# Patient Record
Sex: MALE | Race: BLACK OR AFRICAN AMERICAN
[De-identification: names, ages, dates, MRNs, and addresses within clinical notes are randomized per-mention and may not be internally consistent; named-entity substitution may affect disease eponyms.]

---

## 2019-10-03 ENCOUNTER — HOSPITAL ENCOUNTER (EMERGENCY)
Dept: HOSPITAL 97 - ER | Age: 64
Discharge: HOME | End: 2019-10-03
Payer: OTHER GOVERNMENT

## 2019-10-03 VITALS — SYSTOLIC BLOOD PRESSURE: 167 MMHG | OXYGEN SATURATION: 98 % | DIASTOLIC BLOOD PRESSURE: 83 MMHG

## 2019-10-03 VITALS — TEMPERATURE: 97.4 F

## 2019-10-03 DIAGNOSIS — E11.9: ICD-10-CM

## 2019-10-03 DIAGNOSIS — H60.02: Primary | ICD-10-CM

## 2019-10-03 DIAGNOSIS — F17.210: ICD-10-CM

## 2019-10-03 PROCEDURE — 99283 EMERGENCY DEPT VISIT LOW MDM: CPT

## 2019-10-03 PROCEDURE — 82962 GLUCOSE BLOOD TEST: CPT

## 2019-10-03 PROCEDURE — 36415 COLL VENOUS BLD VENIPUNCTURE: CPT

## 2019-10-03 NOTE — EDPHYS
Physician Documentation                                                                           

 [facility]                                                                 

Name: Larry Palm                                                                                

Age: 63 yrs                                                                                       

Sex: Male                                                                                         

: 1955                                                                                   

MRN: [id number]                                                                                   

Arrival Date: 10/03/2019                                                                          

Time: 08:53                                                                                       

Account#: [id number]                                                                            

Bed 20                                                                                            

Private MD:                                                                                       

IVAN Physician Kofi Hughes                                                                      

HPI:                                                                                              

10/03                                                                                             

09:35 This 63 yrs old Black Male presents to ER via Ambulatory with complaints of Ear Pain.   juarez 

09:35 The patient presents with swelling, tenderness. The complaints affect the left ear.     juarez 

      Onset: The symptoms/episode began/occurred 3 day(s) ago. Modifying factors: The             

      symptoms are alleviated by nothing, the symptoms are aggravated by nothing. Associated      

      signs and symptoms: The patient has no apparent associated signs or symptoms.               

                                                                                                  

Historical:                                                                                       

- Allergies:                                                                                      

09:01 No Known Allergies;                                                                     ss  

- PMHx:                                                                                           

09:57 Diabetes - NIDDM;                                                                       jl7 

                                                                                                  

- Immunization history:: Adult Immunizations up to date.                                          

- Social history:: Smoking status: Patient uses tobacco products, smokes one-half pack            

  cigarettes per day.                                                                             

- Ebola Screening: : Patient denies exposure to infectious person Patient denies travel           

  to an Ebola-affected area in the 21 days before illness onset.                                  

- Family history:: not pertinent.                                                                 

                                                                                                  

                                                                                                  

ROS:                                                                                              

09:35 Constitutional: Negative for fever, chills, and weight loss, Eyes: Negative for injury, juarez 

      pain, redness, and discharge, Neck: Negative for injury, pain, and swelling,                

      Cardiovascular: Negative for chest pain, palpitations, and edema, Respiratory: Negative     

      for shortness of breath, cough, wheezing, and pleuritic chest pain, Abdomen/GI:             

      Negative for abdominal pain, nausea, vomiting, diarrhea, and constipation, Back:            

      Negative for injury and pain, : Negative for injury, bleeding, discharge, and             

      swelling, MS/Extremity: Negative for injury and deformity, Skin: Negative for injury,       

      rash, and discoloration, Neuro: Negative for headache, weakness, numbness, tingling,        

      and seizure, Psych: Negative for depression, anxiety, suicide ideation, homicidal           

      ideation, and hallucinations, Allergy/Immunology: Negative for hives, rash, and             

      allergies, Endocrine: Negative for neck swelling, polydipsia, polyuria, polyphagia, and     

      marked weight changes, Hematologic/Lymphatic: Negative for swollen nodes, abnormal          

      bleeding, and unusual bruising.                                                             

09:35 ENT: Positive for ear pain.                                                                 

                                                                                                  

Exam:                                                                                             

09:35 Constitutional:  This is a well developed, well nourished patient who is awake, alert,  juarez 

      and in no acute distress. Head/Face:  Normocephalic, atraumatic. Eyes:  Pupils equal        

      round and reactive to light, extra-ocular motions intact.  Lids and lashes normal.          

      Conjunctiva and sclera are non-icteric and not injected.  Cornea within normal limits.      

      Periorbital areas with no swelling, redness, or edema. Neck:  Trachea midline, no           

      thyromegaly or masses palpated, and no cervical lymphadenopathy.  Supple, full range of     

      motion without nuchal rigidity, or vertebral point tenderness.  No Meningismus.             

      Chest/axilla:  Normal chest wall appearance and motion.  Nontender with no deformity.       

      No lesions are appreciated. Cardiovascular:  Regular rate and rhythm with a normal S1       

      and S2.  No gallops, murmurs, or rubs.  Normal PMI, no JVD.  No pulse deficits.             

      Respiratory:  Lungs have equal breath sounds bilaterally, clear to auscultation and         

      percussion.  No rales, rhonchi or wheezes noted.  No increased work of breathing, no        

      retractions or nasal flaring. Abdomen/GI:  Soft, non-tender, with normal bowel sounds.      

      No distension or tympany.  No guarding or rebound.  No evidence of tenderness               

      throughout. Back:  No spinal tenderness.  No costovertebral tenderness.  Full range of      

      motion. MS/ Extremity:  Pulses equal, no cyanosis.  Neurovascular intact.  Full, normal     

      range of motion. Neuro:  Awake and alert, GCS 15, oriented to person, place, time, and      

      situation.  Cranial nerves II-XII grossly intact.  Motor strength 5/5 in all                

      extremities.  Sensory grossly intact.  Cerebellar exam normal.  Normal gait. Psych:         

      Awake, alert, with orientation to person, place and time.  Behavior, mood, and affect       

      are within normal limits.                                                                   

09:35 ENT: External ear(s): abscess, that is small, cellulitis.                                   

                                                                                                  

Vital Signs:                                                                                      

08:59  / 89; Pulse 81; Resp 16; Temp 97.4(O); Pulse Ox 99% on R/A; Weight 116.57 kg;    ss  

      Height 6 ft. 5 in. (195.58 cm); Pain 7/10;                                                  

09:57  / 83; Pulse 77; Resp 16 S; Pulse Ox 98% on R/A;                                   

08:59 Body Mass Index 30.48 (116.57 kg, 195.58 cm)                                            ss  

                                                                                                  

MDM:                                                                                              

08:55 Patient medically screened.                                                             [facility] 

09:35 Data reviewed: vital signs, nurses notes, lab test result(s).                           [facility] 

                                                                                                  

10/03                                                                                             

09:44 Order name: NOVA                                                                        dh3 

10/03                                                                                             

09:35 Order name: Blood Glucose Level; Complete Time: 09:42                                   [facility] 

                                                                                                  

Administered Medications:                                                                         

09:50 Drug: Doxycycline 200 mg Route: PO;                                                     [facility] 

09:58 Follow up: Response: Medication administered at discharge.                               

09:50 Drug: Bactrim (160 mg-800 mg (DS) 1 tablet Route: PO;                                    

09:58 Follow up: Response: Medication administered at discharge.                               

09:57 Drug: Bactroban Ointment 2 % 1 application Route: Topical; Site: wound;                  

09:58 Follow up: Response: Medication administered at discharge.                              7 

                                                                                                  

                                                                                                  

Disposition:                                                                                      

10/03/19 09:40 Discharged to Home. Impression: Abscess of external ear - lobe, Type 2 diabetes    

  mellitus.                                                                                       

- Condition is Stable.                                                                            

- Discharge Instructions: Skin Abscess, Type 2 Diabetes Mellitus, Diagnosis, Adult,               

  Skin Abscess, Easy-to-Read, Type 2 Diabetes Mellitus, Diagnosis, Adult, Easy-to-Read.           

- Prescriptions for Bactroban 2 % Topical Ointment - Apply to affected area 1                     

  application by TOPICAL route every 12 hours; 30 gram. Tylenol- Codeine #3 300-30 mg             

  Oral Tablet - take 2 tablets by ORAL route every 6 hours As needed; 20 tablet.                  

  Doxycycline Hyclate 100 mg Oral Tablet - take 1 tablet by ORAL route every 12 hours;            

  20 tablet. Bactrim - 160 mg Oral Tablet - take 1 tablet by ORAL route every 12            

  hours for 10 days; 20 tablet.                                                                   

- Medication Reconciliation Form, Thank You Letter, Antibiotic Education, Prescription            

  Opioid Use form.                                                                                

- Follow up: Private Physician; When: 2 - 3 days; Reason: Recheck today's complaints,             

  Continuance of care, Re-evaluation by your physician. Follow up: Isabel Guadalupe MD; When: 2 - 3 days; Reason: Recheck today's complaints, Continuance of care,                  

  Re-evaluation by your physician.                                                                

- Problem is new.                                                                                 

- Symptoms have improved.                                                                         

                                                                                                  

                                                                                                  

                                                                                                  

Signatures:                                                                                       

Dispatcher [employer]                           Kofi Pineda MD MD cha                                                  

Smirch, Adilia, RN                      RN   ss                                                   

Osmel Neely RN                        RN   jl7                                                  

                                                                                                  

Corrections: (The following items were deleted from the chart)                                    

09:59 09:40 10/03/2019 09:40 Discharged to Home. Impression: Abscess of external ear - lobe;  jl7 

      Type 2 diabetes mellitus. Condition is Stable. Forms are Medication Reconciliation          

      Form, Thank You Letter, Antibiotic Education, Prescription Opioid Use. Follow up:           

      Private Physician; When: 2 - 3 days; Reason: Recheck today's complaints, Continuance of     

      care, Re-evaluation by your physician. Follow up: Isabel Guadalupe; When: 2 - 3 days;      

      Reason: Recheck today's complaints, Continuance of care, Re-evaluation by your              

      physician. Problem is new. Symptoms have improved. juarez                                      

                                                                                                  

**************************************************************************************************

## 2019-10-03 NOTE — ER
Nurse's Notes                                                                                     

 [facility]                                                                 

Name: Larry Palm                                                                                

Age: 63 yrs                                                                                       

Sex: Male                                                                                         

: 1955                                                                                   

MRN: [id number]                                                                                   

Arrival Date: 10/03/2019                                                                          

Time: 08:53                                                                                       

Account#: [id number]                                                                            

Bed 20                                                                                            

Private MD:                                                                                       

Diagnosis: Abscess of external ear-lobe;Type 2 diabetes mellitus                                  

                                                                                                  

Presentation:                                                                                     

10/03                                                                                             

09:01 Presenting complaint: Patient states: pain and swelling to lobe of L ear that began two ss  

      weeks ago. Transition of care: patient was not received from another setting of care.       

      Onset of symptoms was 2019. Risk Assessment: Do you want to hurt yourself or      

      someone else? Patient reports no desire to harm self or others. Initial Sepsis Screen:      

      Does the patient meet any 2 criteria? No. Patient's initial sepsis screen is negative.      

      Does the patient have a suspected source of infection? No. Patient's initial sepsis         

      screen is negative. Care prior to arrival: None.                                            

09:01 Method Of Arrival: Ambulatory                                                           ss  

09:01 Acuity: MARLEN 5                                                                           ss  

                                                                                                  

Historical:                                                                                       

- Allergies:                                                                                      

09:01 No Known Allergies;                                                                     ss  

- PMHx:                                                                                           

09:57 Diabetes - NIDDM;                                                                       jl7 

                                                                                                  

- Immunization history:: Adult Immunizations up to date.                                          

- Social history:: Smoking status: Patient uses tobacco products, smokes one-half pack            

  cigarettes per day.                                                                             

- Ebola Screening: : Patient denies exposure to infectious person Patient denies travel           

  to an Ebola-affected area in the 21 days before illness onset.                                  

- Family history:: not pertinent.                                                                 

                                                                                                  

                                                                                                  

Screenin:02 Abuse screen: Denies threats or abuse. Denies injuries from another. Nutritional        ss  

      screening: No deficits noted. Tuberculosis screening: Never had TB. Fall Risk None          

      identified.                                                                                 

                                                                                                  

Assessment:                                                                                       

09:02 General: Appears in no apparent distress. comfortable, Behavior is calm, cooperative.   ss  

      Pain: Complains of pain in left ear lobe Pain currently is 7 out of 10 on a pain scale.     

      Quality of pain is described as tender, Pain began 2 weeks ago Is continuous. Neuro:        

      Level of Consciousness is awake, alert, obeys commands, Oriented to person, place,          

      time, situation. Cardiovascular: Capillary refill < 3 seconds is brisk in bilateral         

      fingers. Respiratory: Airway is patent Respiratory effort is even, unlabored,               

      Respiratory pattern is regular, symmetrical. GI: No signs and/or symptoms were reported     

      involving the gastrointestinal system. EENT: Nares are clear Throat is clear. Derm:         

      Skin is pink, warm \T\ dry. normal. Musculoskeletal: Range of motion: intact in all         

      extremities, Swelling mild swelling and redness noted to lobe of L ear.                     

                                                                                                  

Vital Signs:                                                                                      

08:59  / 89; Pulse 81; Resp 16; Temp 97.4(O); Pulse Ox 99% on R/A; Weight 116.57 kg;      

      Height 6 ft. 5 in. (195.58 cm); Pain 7/10;                                                  

09:57  / 83; Pulse 77; Resp 16 S; Pulse Ox 98% on R/A;                                  jl7 

08:59 Body Mass Index 30.48 (116.57 kg, 195.58 cm)                                              

                                                                                                  

ED Course:                                                                                        

08:53 Patient arrived in ED.                                                                  mr  

08:55 Kofi Hughes MD is Attending Physician.                                             [facility] 

09:01 Arm band placed on left wrist.                                                            

09:02 Triage completed.                                                                         

09:02 Patient has correct armband on for positive identification. Bed in low position. Call     

      light in reach.                                                                             

09:18 Neely, Jahala, RN is Primary Nurse.                                                      jl7 

09:39 Isabel Guadalupe MD is Referral Physician.                                           juarez 

09:56 No provider procedures requiring assistance completed. Patient did not have IV access   jl7 

      during this emergency room visit.                                                           

                                                                                                  

Administered Medications:                                                                         

09:50 Drug: Doxycycline 200 mg Route: PO;                                                     jl7 

09:58 Follow up: Response: Medication administered at discharge.                              jl7 

09:50 Drug: Bactrim (160 mg-800 mg (DS) 1 tablet Route: PO;                                   jl7 

09:58 Follow up: Response: Medication administered at discharge.                              jl7 

09:57 Drug: Bactroban Ointment 2 % 1 application Route: Topical; Site: wound;                 jl7 

09:58 Follow up: Response: Medication administered at discharge.                              jl7 

                                                                                                  

                                                                                                  

Outcome:                                                                                          

09:40 Discharge ordered by MD.                                                                juarez 

09:56 Discharged to home ambulatory.                                                          jl7 

09:56 Condition: stable                                                                           

09:56 Discharge instructions given to patient, Instructed on discharge instructions, follow       

      up and referral plans. medication usage, Demonstrated understanding of instructions,        

      follow-up care, medications, Prescriptions given X 4.                                       

09:59 Patient left the ED.                                                                    jl7 

                                                                                                  

Signatures:                                                                                       

Kofi Hughes MD MD cha Rivera, Mary mr Smirch, Shelby, RN RN                                                      

Neely, Jahala, RN                        RN   jl7                                                  

                                                                                                  

**************************************************************************************************

## 2019-11-01 ENCOUNTER — HOSPITAL ENCOUNTER (EMERGENCY)
Dept: HOSPITAL 97 - ER | Age: 64
Discharge: HOME | End: 2019-11-01
Payer: OTHER GOVERNMENT

## 2019-11-01 VITALS — OXYGEN SATURATION: 100 %

## 2019-11-01 VITALS — TEMPERATURE: 97.5 F | SYSTOLIC BLOOD PRESSURE: 176 MMHG | DIASTOLIC BLOOD PRESSURE: 87 MMHG

## 2019-11-01 DIAGNOSIS — L73.2: ICD-10-CM

## 2019-11-01 DIAGNOSIS — L02.214: Primary | ICD-10-CM

## 2019-11-01 PROCEDURE — 99283 EMERGENCY DEPT VISIT LOW MDM: CPT

## 2019-11-01 PROCEDURE — 0J9B0ZZ DRAINAGE OF PERINEUM SUBCUTANEOUS TISSUE AND FASCIA, OPEN APPROACH: ICD-10-PCS

## 2019-11-01 NOTE — EDPHYS
Physician Documentation                                                                           

 [facility]                                                                 

Name: Larry Palm                                                                                

Age: 64 yrs                                                                                       

Sex: Male                                                                                         

: 1955                                                                                   

MRN: [id number]                                                                                   

Arrival Date: 2019                                                                          

Time: 07:15                                                                                       

Account#: [id number]                                                                            

Bed 13                                                                                            

Private MD:                                                                                       

ED Physician Lew Neely                                                                         

HPI:                                                                                              

                                                                                             

07:24 This 64 yrs old Black Male presents to ER via Unassigned with complaints of Abscess.    rn  

07:24 The patient presents with an abscess of the right groin. Description: The affected area rn  

      is small, localized, erythematous, fluctuant. Onset: The symptoms/episode                   

      began/occurred 2 day(s) ago. Possible cause(s): unknown. Modifying factors: the             

      symptoms are alleviated by nothing, the symptoms are aggravated by walking, touching.       

      Severity of symptoms: At their worst the symptoms were mild, in the emergency               

      department the symptoms are unchanged. The patient has experienced similar episodes in      

      the past. Reports chronic skin infections, no fever, reports had abx for other abscess      

      a few weeks ago, bumps popped up in right groin, has required multiple incision and         

      drainage in past. Reports pain with walking and touching area. No trauma. .                 

                                                                                                  

Historical:                                                                                       

- Allergies:                                                                                      

07:25 No Known Allergies;                                                                     hb  

- PMHx:                                                                                           

07:25 Diabetes - NIDDM;                                                                       hb  

                                                                                                  

- Immunization history:: Adult Immunizations up to date.                                          

- Social history:: Smoking status: Patient uses tobacco products, denies chronic                  

  smoking, but will smoke occasionally.                                                           

- Ebola Screening: : No symptoms or risks identified at this time.                                

- Family history:: not pertinent.                                                                 

- Hospitalizations: : No recent hospitalization is reported.                                      

                                                                                                  

                                                                                                  

ROS:                                                                                              

07:24 Constitutional: Negative for fever, chills, and weight loss, MS/Extremity: Negative for rn  

      injury and deformity, Skin: + swollen area right groin                                      

                                                                                                  

Exam:                                                                                             

07:24 Constitutional:  This is a well developed, well nourished patient who is awake, alert,  rn  

      and in no acute distress.  Ambulatory to room without assistance. Skin:  warm, dry,         

      right groin with multiple subcentimeter pustules, and a single larger approx 2cm            

      diameter area of fluctuance and tenderness. No lesions on penis or scrotum.                 

                                                                                                  

Vital Signs:                                                                                      

07:25  / 90; Pulse 84; Resp 16; Temp 97.4; Pulse Ox 100% ; Weight 115.67 kg; Height 6   hb  

      ft. 5 in. (195.58 cm); Pain 10/10;                                                          

08:20  / 87; Pulse 89; Resp 16; Temp 97.5; Pulse Ox 100% ;                              bp  

07:25 Body Mass Index 30.24 (115.67 kg, 195.58 cm)                                            hb  

                                                                                                  

Procedures:                                                                                       

07:51 I \T\ D: Incision and drainage was performed for an abscess of the right groin Prepped    rn

      with Betadine, Anesthetized with 3 ml's 1% Lidocaine. Incised with #11 blade. Drained       

      small amount purulent fluid. serosanguinous fluid. Dressing: sterile 4x4 gauze, the         

      patient tolerated the procedure well, Cut 2 small abscesses, small amount of purulence,     

      fluctuance gone, improved pain, too small to pack. .                                        

                                                                                                  

MDM:                                                                                              

07:20 Patient medically screened.                                                             rn  

07:51 Differential diagnosis: abscess, hidradenitis suppurativa . Data reviewed: vital signs, rn  

      nurses notes, and as a result, I will discharge patient. Counseling: I had a detailed       

      discussion with the patient and/or guardian regarding: the historical points, exam          

      findings, and any diagnostic results supporting the discharge/admit diagnosis, the need     

      for outpatient follow up, to return to the emergency department if symptoms worsen or       

      persist or if there are any questions or concerns that arise at home. Response to           

      treatment: the patient's symptoms have mildly improved after treatment, and as a            

      result, I will discharge patient. Special discussion: I discussed with the                  

      patient/guardian in detail that at this point there is no indication for admission to       

      the hospital. It is understood, however, that if the symptoms persist or worsen the         

      patient needs to return immediately for re-evaluation.                                      

                                                                                                  

                                                                                             

07:24 Order name: Incision \T\ Drainage Setup; Complete Time: 07:33                             rn

                                                                                                  

Administered Medications:                                                                         

07:33 Drug: Lidocaine (1 %) 1 vials {Note: AT B/S FOR MD.} Volume: 5 ml; Route: Infiltration; bp  

07:55 Drug: Norco 5 mg-325 mg 1 tabs Route: PO;                                               bp  

08:22 Follow up: Response: Pain is decreased                                                  bp  

                                                                                                  

                                                                                                  

Disposition:                                                                                      

19 07:53 Discharged to Home. Impression: Cutaneous abscess of groin, Hidradenitis           

  suppurativa.                                                                                    

- Condition is Stable.                                                                            

- Discharge Instructions: Skin Abscess, Hidradenitis Suppurativa, Incision and                    

  Drainage, Percutaneous Abscess Drain, Care After.                                               

- Prescriptions for Clindamycin HCl 300 mg Oral Capsule - take 1 capsule by ORAL route            

  every 6 hours for 10 days; 40 capsule. Tylenol- Codeine #3 300-30 mg Oral Tablet -              

  take 1 tablet by ORAL route every 6 hours As needed; 20 tablet.                                 

- Medication Reconciliation Form, Thank You Letter, Antibiotic Education, Prescription            

  Opioid Use form.                                                                                

- Follow up: Private Physician; When: As needed; Reason: Recheck today's complaints,              

  Re-evaluation by your physician.                                                                

- Problem is new.                                                                                 

- Symptoms have improved.                                                                         

                                                                                                  

                                                                                                  

                                                                                                  

Signatures:                                                                                       

Lew Neely MD MD rn Baxter, Heather, RN RN hb Peltier, Brian, RN                      RN   bp                                                   

                                                                                                  

Corrections: (The following items were deleted from the chart)                                    

08:22 07:53 2019 07:53 Discharged to Home. Impression: Cutaneous abscess of groin;      bp  

      Hidradenitis suppurativa. Condition is Stable. Forms are Medication Reconciliation          

      Form, Thank You Letter, Antibiotic Education, Prescription Opioid Use. Follow up:           

      Private Physician; When: As needed; Reason: Recheck today's complaints, Re-evaluation       

      by your physician. Problem is new. Symptoms have improved. rn                               

                                                                                                  

**************************************************************************************************

## 2019-11-01 NOTE — ER
Nurse's Notes                                                                                     

 [facility]                                                                 

Name: Larry Palm                                                                                

Age: 64 yrs                                                                                       

Sex: Male                                                                                         

: 1955                                                                                   

MRN: [id number]                                                                                   

Arrival Date: 2019                                                                          

Time: 07:15                                                                                       

Account#: [id number]                                                                            

Bed 13                                                                                            

Private MD:                                                                                       

Diagnosis: Cutaneous abscess of groin;Hidradenitis suppurativa                                    

                                                                                                  

Presentation:                                                                                     

                                                                                             

07:25 Presenting complaint: Right groin abscess x 2 days. Transition of care: patient was not hb  

      received from another setting of care. Onset of symptoms was 2019. Risk         

      Assessment: Do you want to hurt yourself or someone else? Patient reports no desire to      

      harm self or others. Initial Sepsis Screen: Does the patient meet any 2 criteria? No.       

      Patient's initial sepsis screen is negative. Does the patient have a suspected source       

      of infection? No. Patient's initial sepsis screen is negative. Care prior to arrival:       

      None.                                                                                       

07:25 Method Of Arrival: Ambulatory                                                           hb  

07:25 Acuity: MARLEN 4                                                                           hb  

                                                                                                  

Triage Assessment:                                                                                

07:25 General: Appears in no apparent distress. uncomfortable, Behavior is cooperative,       bp  

      appropriate for age, anxious. Pain: Complains of pain in pelvis. EENT: No deficits          

      noted. Neuro: No deficits noted. Cardiovascular: No deficits noted. Respiratory: No         

      deficits noted. GI: No signs and/or symptoms were reported involving the                    

      gastrointestinal system. : No signs and/or symptoms were reported regarding the           

      genitourinary system. Derm: Abscess located on pelvis. Musculoskeletal: No deficits         

      noted.                                                                                      

                                                                                                  

Historical:                                                                                       

- Allergies:                                                                                      

07:25 No Known Allergies;                                                                     hb  

- PMHx:                                                                                           

07:25 Diabetes - NIDDM;                                                                       hb  

                                                                                                  

- Immunization history:: Adult Immunizations up to date.                                          

- Social history:: Smoking status: Patient uses tobacco products, denies chronic                  

  smoking, but will smoke occasionally.                                                           

- Ebola Screening: : No symptoms or risks identified at this time.                                

- Family history:: not pertinent.                                                                 

- Hospitalizations: : No recent hospitalization is reported.                                      

                                                                                                  

                                                                                                  

Screenin:42 Abuse screen: Denies threats or abuse. Denies injuries from another. Nutritional        bp  

      screening: No deficits noted. Tuberculosis screening: No symptoms or risk factors           

      identified. Fall Risk None identified.                                                      

                                                                                                  

Assessment:                                                                                       

07:30 General: Appears in no apparent distress. uncomfortable, Behavior is cooperative,       bp  

      appropriate for age, anxious.                                                               

07:30 Pain: Complains of pain in pelvis. Neuro: No deficits noted. Cardiovascular: No         bp  

      deficits noted. Respiratory: No deficits noted. GI: No signs and/or symptoms were           

      reported involving the gastrointestinal system. : No signs and/or symptoms were           

      reported regarding the genitourinary system. EENT: No deficits noted. Derm: Abscess         

      located on pelvis. Musculoskeletal: No deficits noted.                                      

08:20 Reassessment: PT D/C HOME AMBULATORY WITH FAMILY, DX WITH CUTANEOUS ABSCESS.            bp  

                                                                                                  

Vital Signs:                                                                                      

07:25  / 90; Pulse 84; Resp 16; Temp 97.4; Pulse Ox 100% ; Weight 115.67 kg; Height 6   hb  

      ft. 5 in. (195.58 cm); Pain 10/10;                                                          

08:20  / 87; Pulse 89; Resp 16; Temp 97.5; Pulse Ox 100% ;                              bp  

07:25 Body Mass Index 30.24 (115.67 kg, 195.58 cm)                                            hb  

                                                                                                  

ED Course:                                                                                        

07:15 Patient arrived in ED.                                                                  as  

07:20 Kavon Barone RN is Primary Nurse.                                                       la1 

07:20 Lew Neely MD is Attending Physician.                                                rn  

07:26 Triage completed.                                                                       hb  

07:27 Arm band placed on.                                                                     hb  

07:42 Patient has correct armband on for positive identification. Placed in gown. Bed in low  bp  

      position. Call light in reach. Side rails up X2.                                            

07:54 Assist provider with I \T\ D: of an abscess on right perineal Set up I\T\D tray. Performed  bp

      by Lew Neely MD Dressing with 4X4s, Patient tolerated well. Patient did not have IV       

      access during this emergency room visit.                                                    

                                                                                                  

Administered Medications:                                                                         

07:33 Drug: Lidocaine (1 %) 1 vials {Note: AT B/S FOR MD.} Volume: 5 ml; Route: Infiltration; bp  

07:55 Drug: Norco 5 mg-325 mg 1 tabs Route: PO;                                               bp  

08:22 Follow up: Response: Pain is decreased                                                  bp  

                                                                                                  

                                                                                                  

Outcome:                                                                                          

07:53 Discharge ordered by MD.                                                                rn  

08:21 Discharged to home ambulatory, with family.                                             bp  

08:21 Condition: stable                                                                           

08:21 Discharge instructions given to patient, Instructed on discharge instructions, follow       

      up and referral plans. medication usage, Demonstrated understanding of instructions,        

      follow-up care, medications, wound care, Prescriptions given X 2.                           

08:22 Patient left the ED.                                                                    bp  

                                                                                                  

Signatures:                                                                                       

Manuela Galindo Roman, MD MD rn Attema, Lee, RN                         RN   la1                                                  

Kenia Guzmán, RN                     RN   hb                                                   

Troy Hamilton, RN                      RN   bp                                                   

                                                                                                  

**************************************************************************************************

## 2020-02-22 ENCOUNTER — HOSPITAL ENCOUNTER (EMERGENCY)
Dept: HOSPITAL 97 - ER | Age: 65
Discharge: HOME | End: 2020-02-22
Payer: OTHER GOVERNMENT

## 2020-02-22 VITALS — TEMPERATURE: 97.9 F

## 2020-02-22 VITALS — SYSTOLIC BLOOD PRESSURE: 162 MMHG | DIASTOLIC BLOOD PRESSURE: 76 MMHG | OXYGEN SATURATION: 95 %

## 2020-02-22 DIAGNOSIS — I10: ICD-10-CM

## 2020-02-22 DIAGNOSIS — E11.9: ICD-10-CM

## 2020-02-22 DIAGNOSIS — Z48.01: Primary | ICD-10-CM

## 2020-02-22 PROCEDURE — 99283 EMERGENCY DEPT VISIT LOW MDM: CPT

## 2020-02-22 NOTE — ER
Nurse's Notes                                                                                     

 [facility]                                                                 

Name: Larry Palm                                                                                

Age: 64 yrs                                                                                       

Sex: Male                                                                                         

: 1955                                                                                   

MRN: [id number]                                                                                   

Arrival Date: 2020                                                                          

Time: 10:32                                                                                       

Account#: [id number]                                                                            

Bed 19                                                                                            

Private MD:                                                                                       

Diagnosis: Encounter for change or removal of surgical wound dressing                             

                                                                                                  

Presentation:                                                                                     

                                                                                             

10:52 Presenting complaint: Patient states: [facility]    iw  

      Thurs, was sent home with a wound vac, it started beeping today and his wound started       

      bleeding. Transition of care: patient was not received from another setting of care.        

      Onset of symptoms was 2020. Risk Assessment: Do you want to hurt yourself      

      or someone else? Patient reports no desire to harm self or others. Initial Sepsis           

      Screen: Does the patient meet any 2 criteria? No. Patient's initial sepsis screen is        

      negative. Does the patient have a suspected source of infection? No. Patient's initial      

      sepsis screen is negative. Care prior to arrival: None.                                     

10:52 Method Of Arrival: Ambulatory                                                           iw  

10:52 Acuity: MARLEN 3                                                                           iw  

                                                                                                  

Historical:                                                                                       

- Allergies:                                                                                      

10:55 No Known Allergies;                                                                     iw  

- Home Meds:                                                                                      

10:55 Lisinopril Oral [Active]; amlodipine oral [Active]; Levemir 100 unit/mL subcutaneous    iw  

      soln [Active]; Novolog 100 unit/mL Sub-Q soln [Active];                                     

- PMHx:                                                                                           

10:55 Diabetes - NIDDM; Hypertension;                                                         iw  

- PSHx:                                                                                           

10:55 I \T\ D;                                                                                  iw

                                                                                                  

- Immunization history:: Adult Immunizations up to date.                                          

- Coronavirus screen:: The patient has NOT traveled to [place] in the past 14 days. The             

  patient has NOT had contact with known/suspected case of Coronavirus?.                          

- Social history:: Smoking status: Patient/guardian denies using.                                 

- Ebola Screening: : Patient negative for fever greater than or equal to 101.5 degrees            

  Fahrenheit, and additional compatible Ebola Virus Disease symptoms.                             

                                                                                                  

                                                                                                  

Screening:                                                                                        

10:43 Abuse screen: Denies threats or abuse. Nutritional screening: No deficits noted.        rb1 

      Tuberculosis screening: No symptoms or risk factors identified. Fall Risk None              

      identified.                                                                                 

                                                                                                  

Assessment:                                                                                       

10:43 General: Appears in no apparent distress. comfortable, Behavior is calm, cooperative,   rb1 

      Denies fever. Pain: Complains of pain in right groin Pain currently is 5 out of 10 on a     

      pain scale. Pain began Had an abscess drained on Thursday per pt. report. Neuro: Level      

      of Consciousness is awake, alert, obeys commands, Oriented to person, place, time,          

      situation. Cardiovascular: Capillary refill < 3 seconds is brisk in bilateral fingers.      

      Respiratory: Airway is patent Respiratory effort is even, unlabored, Respiratory            

      pattern is regular, symmetrical. GI: No signs and/or symptoms were reported involving       

      the gastrointestinal system. : No signs and/or symptoms were reported regarding the       

      genitourinary system. Derm: Wound noted right groin Wound is wound vac is in place.         

      Musculoskeletal: Range of motion: intact in all extremities.                                

11:10 Reassessment: Applied additional Tegaderm to the wound site to create a better seal.    rb1 

      Pt. tolerated well. The wound vac canister is not beeping now.                              

12:00 Reassessment: Patient appears in no apparent distress at this time. Patient and/or      rb1 

      family updated on plan of care and expected duration. Pain level reassessed. Patient is     

      alert, oriented x 3, equal unlabored respirations, skin warm/dry/pink.                      

13:00 Reassessment: Patient appears in no apparent distress at this time. No changes from     rb1 

      previously documented assessment.                                                           

14:00 Reassessment: Patient appears in no apparent distress at this time. Patient and/or      rb1 

      family updated on plan of care and expected duration. Pain level reassessed. Patient is     

      alert, oriented x 3, equal unlabored respirations, skin warm/dry/pink. Pt. ambulated to     

      the restroom without difficulty.                                                            

14:06 Reassessment: I was informed by the provider that CATHIE Ramos will be changing the pt.    rb1 

      wound vac in the right groin.                                                               

14:45 Reassessment: Discharge pending to awaiting for the wound vac to be changed on the pt.  rb1 

      right groin.                                                                                

15:00 Reassessment: Patient appears in no apparent distress at this time. No changes from     rb1 

      previously documented assessment. CATHIE Ramos is unable to change the wound vac at this       

      time.                                                                                       

16:00 Reassessment: Discharge pending due to [facility] per          rb1 

      provider's report.                                                                          

16:11 Reassessment: Pt. is frustrated because the wound vac has not been changed. He is ready rb1 

      to go home.                                                                                 

                                                                                                  

Vital Signs:                                                                                      

10:42  / 82; Pulse 77; Resp 19; Pulse Ox 98% ; Pain 5/10;                               rb1 

11:40  / 66; Pulse 68; Resp 19; Temp 97.9(O); Pulse Ox 98% on R/A;                      rb1 

12:38  / 72; Pulse 66; Resp 18; Pulse Ox 98% on R/A;                                    rb1 

13:40  / 70; Pulse 66; Resp 19; Pulse Ox 99% on R/A;                                    rb1 

14:35  / 76; Pulse 65; Resp 18; Pulse Ox 95% on R/A;                                    rb1 

15:35  / 70; Pulse 64; Resp 19; Pulse Ox 96% ;                                          rb1 

                                                                                                  

ED Course:                                                                                        

10:32 Patient arrived in ED.                                                                  as  

10:36 Zeynep Conroy, RN is Primary Nurse.                                                   rb1 

10:42 Deonte Waterman PA is PHCP.                                                              [facility] 

10:42 Lew Neely MD is Attending Physician.                                                [facility] 

10:43 Patient has correct armband on for positive identification. Bed in low position. Call   rb1 

      light in reach. Side rails up X 1. Pulse ox on. NIBP on.                                    

10:53 Triage completed.                                                                       iw  

10:55 Arm band placed on.                                                                     iw  

16:19 No provider procedures requiring assistance completed. Patient did not have IV access   rb1 

      during this emergency room visit.                                                           

                                                                                                  

Administered Medications:                                                                         

No medications were administered                                                                  

                                                                                                  

                                                                                                  

Outcome:                                                                                          

14:41 Discharge ordered by MD.                                                                [facility] 

16:19 Patient left the ED.                                                                    rb1 

16:19 Discharged to home ambulatory.                                                          rb1 

16:19 Condition: stable                                                                           

16:19 Discharge instructions given to patient, Instructed on discharge instructions, follow       

      up and referral plans. Demonstrated understanding of instructions, follow-up care,          

      Prescriptions given X none                                                                  

                                                                                                  

Signatures:                                                                                       

Deonte Waterman PA PA jmm Martinez, Amelia as Williams, Irene, RN                     RN   iw                                                   

Zeynep Conroy, RN                     RN   [facility]                                                  

                                                                                                  

**************************************************************************************************

## 2020-02-22 NOTE — EDPHYS
Physician Documentation                                                                           

 [facility]                                                                 

Name: Larry Palm                                                                                

Age: 64 yrs                                                                                       

Sex: Male                                                                                         

: 1955                                                                                   

MRN: [id number]                                                                                   

Arrival Date: 2020                                                                          

Time: 10:32                                                                                       

Account#: [id number]                                                                            

Bed 19                                                                                            

Private MD:                                                                                       

ED Physician Lew Neely                                                                         

HPI:                                                                                              

                                                                                             

10:47 This 64 yrs old Black Male presents to ER via Ambulatory with complaints of Wound Check.[facility] 

10:47 Patient presents to ED for recheck of: abscess. The affected area is on the pelvis. The [facility] 

      patient has experienced similar episodes in the past. This is a 64 year old male with a     

      history of dm, htn that presents to the ED with complaints of a wound vac malfuntion.       

      Patient is 2 days s/p incision and drainage. Advised by surgery to go to the ED if the      

      wound vac indicates malfunction. [facility] and [facility] told they could        

      not help him today. Patient is currently taking doxycycline. .                              

                                                                                                  

Historical:                                                                                       

- Allergies:                                                                                      

10:55 No Known Allergies;                                                                     iw  

- Home Meds:                                                                                      

10:55 Lisinopril Oral [Active]; amlodipine oral [Active]; Levemir 100 unit/mL subcutaneous    iw  

      soln [Active]; Novolog 100 unit/mL Sub-Q soln [Active];                                     

- PMHx:                                                                                           

10:55 Diabetes - NIDDM; Hypertension;                                                         iw  

- PSHx:                                                                                           

10:55 I \T\ D;                                                                                  iw

                                                                                                  

- Immunization history:: Adult Immunizations up to date.                                          

- Coronavirus screen:: The patient has NOT traveled to [place] in the past 14 days. The             

  patient has NOT had contact with known/suspected case of Coronavirus?.                          

- Social history:: Smoking status: Patient/guardian denies using.                                 

- Ebola Screening: : Patient negative for fever greater than or equal to 101.5 degrees            

  Fahrenheit, and additional compatible Ebola Virus Disease symptoms.                             

                                                                                                  

                                                                                                  

ROS:                                                                                              

10:47 Constitutional: Negative for fever, chills, and weight loss, Cardiovascular: Negative   [facility] 

      for chest pain, palpitations, and edema, Respiratory: Negative for shortness of breath,     

      cough, wheezing, and pleuritic chest pain.                                                  

10:47 Skin: Positive for wound.                                                                   

10:47 All other systems are negative.                                                             

                                                                                                  

Exam:                                                                                             

10:47 Head/Face:  atraumatic. Eyes:  EOMI, no conjunctival erythema appreciated ENT:  Moist   jmm 

      Mucus Membranes Neck:  Trachea midline, Supple Chest/axilla:  Normal chest wall             

      appearance and motion.   Cardiovascular:  Regular rate and rhythm.  No edema                

      appreciated Respiratory:  Normal respirations, no respiratory distress appreciated          

      Abdomen/GI:  Non distended, soft Back:  Normal ROM                                          

10:47 Constitutional: The patient appears alert, awake, anxious.                                  

10:47 Skin: wound attached to a draining incision at the right groin.  seal appears broken        

      inferior to the tubing attachment site.  .                                                  

10:47 Neuro: Orientation: is normal, Mentation: is normal, Memory: is normal.                     

10:47 Psych: Behavior/mood is pleasant, cooperative.                                              

                                                                                                  

Vital Signs:                                                                                      

10:42  / 82; Pulse 77; Resp 19; Pulse Ox 98% ; Pain 5/10;                               rb1 

11:40  / 66; Pulse 68; Resp 19; Temp 97.9(O); Pulse Ox 98% on R/A;                      rb1 

12:38  / 72; Pulse 66; Resp 18; Pulse Ox 98% on R/A;                                    rb1 

13:40  / 70; Pulse 66; Resp 19; Pulse Ox 99% on R/A;                                    rb1 

14:35  / 76; Pulse 65; Resp 18; Pulse Ox 95% on R/A;                                    rb1 

15:35  / 70; Pulse 64; Resp 19; Pulse Ox 96% ;                                          rb1 

                                                                                                  

MDM:                                                                                              

10:47 Patient medically screened.                                                             [facility] 

11:46 Data reviewed: vital signs, nurses notes. Counseling: I had a detailed discussion with  robert 

      the patient and/or guardian regarding: the historical points, exam findings, and any        

      diagnostic results supporting the discharge/admit diagnosis, the need for outpatient        

      follow up, to return to the emergency department if symptoms worsen or persist or if        

      there are any questions or concerns that arise at home. ED course: Tegaderm was applied     

      by RN to seal wound vac. Wound vac still appears malfunctional. Patient is advised to       

      follow up with wound care and otherwise given wound infection return precaution. .          

                                                                                                  

Administered Medications:                                                                         

No medications were administered                                                                  

                                                                                                  

                                                                                                  

Disposition:                                                                                      

17:53 Co-signature as Attending Physician, Lew Neely MD.                                    rn  

                                                                                                  

Disposition:                                                                                      

20 14:41 Discharged to Home. Impression: Encounter for change or removal of surgical        

  wound dressing.                                                                                 

- Condition is Stable.                                                                            

                                                                                                  

                                                                                                  

- Medication Reconciliation Form, Thank You Letter, Antibiotic Education, Prescription            

  Opioid Use form.                                                                                

- Follow up: Private Physician; When: 2 - 3 days; Reason: Recheck today's complaints,             

  Continuance of care, Re-evaluation by your physician.                                           

- Notes: Please follow up with wound care center. [address] [phone]                  

                                                                                                  

                                                                                                  

Signatures:                                                                                       

Deonte Waterman PA PA jmm Williams, Irene, CATHIE                     RN   iw                                                   

Lew Neely MD MD   rn                                                   

Zeynep Conroy RN                     RN   rb1                                                  

                                                                                                  

Corrections: (The following items were deleted from the chart)                                    

16:19 14:41 2020 14:41 Discharged to Home. Impression: Encounter for change or removal  rb1 

      of surgical wound dressing. Condition is Stable. Forms are Medication Reconciliation        

      Form, Thank You Letter, Antibiotic Education, Prescription Opioid Use. Follow up:           

      Private Physician; When: 2 - 3 days; Reason: Recheck today's complaints, Continuance of     

      care, Re-evaluation by your physician. latoya                                                  

                                                                                                  

**************************************************************************************************

## 2020-03-01 ENCOUNTER — HOSPITAL ENCOUNTER (EMERGENCY)
Dept: HOSPITAL 97 - ER | Age: 65
LOS: 1 days | Discharge: HOME | End: 2020-03-02
Payer: OTHER GOVERNMENT

## 2020-03-01 DIAGNOSIS — T81.89XA: Primary | ICD-10-CM
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
## 2020-03-01 DIAGNOSIS — F17.210: ICD-10-CM

## 2020-03-01 DIAGNOSIS — S31.103A: ICD-10-CM

## 2020-03-01 LAB
BUN BLD-MCNC: 14 MG/DL (ref 7–18)
GLUCOSE SERPLBLD-MCNC: 170 MG/DL (ref 74–106)
HCT VFR BLD CALC: 42 % (ref 39.6–49)
LYMPHOCYTES # SPEC AUTO: 1.9 K/UL (ref 0.7–4.9)
PMV BLD: 7.1 FL (ref 7.6–11.3)
POTASSIUM SERPL-SCNC: 4 MMOL/L (ref 3.5–5.1)
RBC # BLD: 4.77 M/UL (ref 4.33–5.43)

## 2020-03-01 PROCEDURE — 85025 COMPLETE CBC W/AUTO DIFF WBC: CPT

## 2020-03-01 PROCEDURE — 36415 COLL VENOUS BLD VENIPUNCTURE: CPT

## 2020-03-01 PROCEDURE — 99284 EMERGENCY DEPT VISIT MOD MDM: CPT

## 2020-03-01 PROCEDURE — 80048 BASIC METABOLIC PNL TOTAL CA: CPT

## 2020-03-02 VITALS — DIASTOLIC BLOOD PRESSURE: 87 MMHG | OXYGEN SATURATION: 99 % | SYSTOLIC BLOOD PRESSURE: 178 MMHG

## 2020-03-02 VITALS — TEMPERATURE: 98.6 F

## 2020-03-02 PROCEDURE — 0Y350ZZ CONTROL BLEEDING IN RIGHT INGUINAL REGION, OPEN APPROACH: ICD-10-PCS

## 2020-03-02 NOTE — ER
Nurse's Notes                                                                                     

 [facility]                                                                 

Name: Larry Palm                                                                                

Age: 64 yrs                                                                                       

Sex: Male                                                                                         

: 1955                                                                                   

MRN: [id number]                                                                                   

Arrival Date: 2020                                                                          

Time: 22:31                                                                                       

Account#: [id number]                                                                            

Bed 2                                                                                             

Private MD:                                                                                       

Diagnosis: Encounter for Arteriole bleed of open surgical wound                                   

                                                                                                  

Presentation:                                                                                     

                                                                                             

22:46 Chief complaint: Patient states: HAD A [place]IN SURGERY DONE [place] [facility] [place] AND   rv  

      THE SURGICAL WOUND STARTED TO BLEED THIRTY MINUTES AGO. DENIES DIZZINESS OR WEAKNESS.       

      Coronavirus screen: The patient has NOT traveled to [place] in the past 14 days. Proceed      

      with normal triage procedures. The patient has NOT had contact with known and/or            

      suspected case of Coronavirus. Proceed with normal triage procedures. Ebola Screen: No      

      symptoms or risks identified at this time. Initial Sepsis Screen: Does the patient meet     

      any 2 criteria? No. Patient's initial sepsis screen is negative. Does the patient have      

      a suspected source of infection? No. Patient's initial sepsis screen is negative. Risk      

      Assessment: Do you want to hurt yourself or someone else? Patient reports no desire to      

      harm self or others.                                                                        

22:46 Method Of Arrival: Ambulatory                                                           rv  

22:46 Acuity: MARLEN 2                                                                           rv  

22:52 Onset of symptoms was 2020 at 22:30.                                          rv  

                                                                                                  

Historical:                                                                                       

- Allergies:                                                                                      

22:49 No Known Allergies;                                                                     rv  

- PMHx:                                                                                           

22:49 Diabetes - NIDDM; Hypertension;                                                         rv  

- PSHx:                                                                                           

22:49 GROIN SURGERY;                                                                          rv  

                                                                                                  

- Immunization history:: Adult Immunizations up to date.                                          

- Social history:: Smoking status: Patient reports the use of cigarette tobacco                   

  products, 3 CIGS A DAY.                                                                         

                                                                                                  

                                                                                                  

Screenin:51 Abuse screen: Denies threats or abuse. Denies injuries from another. Nutritional        rv  

      screening: No deficits noted. Tuberculosis screening: No symptoms or risk factors           

      identified. Fall Risk None identified.                                                      

                                                                                                  

Assessment:                                                                                       

22:50 General: Appears in no apparent distress. comfortable, Behavior is calm, cooperative.   rv  

      Pain: Complains of pain in right femoral area. Neuro: Level of Consciousness is awake,      

      alert, obeys commands, Oriented to person, place, time, situation. Cardiovascular:          

      Patient's skin is warm and dry. Respiratory: Airway is patent. Derm: Wound noted right      

      femoral area Wound is POST OP WOUND, BLEEDING MODERATELY. PAIN ON THE SURGICAL SITE.        

                                                                                             

00:19 Reassessment: Patient and/or family updated on plan of care and expected duration. Pain rv  

      level reassessed. Patient is alert, oriented x 3, equal unlabored respirations, skin        

      warm/dry/pink. ABD dressing placed on left inner thigh, secured with foam tape, pt          

      tolerated well. Discharge instruction given to patient, verbalized the understanding of     

      instruction. Pt left ED via wheelchair accompanied by wife.                                 

                                                                                                  

Vital Signs:                                                                                      

                                                                                             

22:46  / 98; Pulse 92; Resp 18; Temp 98.6; Pulse Ox 99% ; Weight 113.4 kg; Height 6 ft. rv  

      5 in. (195.58 cm); Pain 5/10;                                                               

23:28  / 80; Pulse 82; Resp 18; Pulse Ox 100% on R/A;                                   ea  

                                                                                             

00:15  / 87; Pulse 90; Resp 18; Pulse Ox 99% on R/A;                                    ea  

                                                                                             

22:46 Body Mass Index 29.65 (113.40 kg, 195.58 cm)                                            rv  

                                                                                                  

ED Course:                                                                                        

                                                                                             

22:31 Patient arrived in ED.                                                                  cf2 

22:46 Tevin Thomas, RN is Primary Nurse.                                                  rv  

22:48 Triage completed.                                                                       rv  

22:49 Arm band placed on Patient placed in the treatment room, on a stretcher, Patient        rv  

      notified of wait time.                                                                      

22:51 Patient has correct armband on for positive identification. Bed in low position. Call   rv  

      light in reach. Side rails up X 1. Cardiac monitor on. Pulse ox on. NIBP on.                

23:01 Robbie Hawkins PA is PHCP.                                                               [id number] 

23:01 Braxton Hoskins MD is Attending Physician.                                                    [id number] 

23:15 Cautery of right inner thigh vessel, pt tolerated well.                                 rv  

23:21 Inserted saline lock: 20 gauge in right antecubital area, using aseptic technique.      oe  

      Blood collected.                                                                            

                                                                                             

00:25 IV discontinued, intact, bleeding controlled, No redness/swelling at site. Pressure     rv  

      dressing applied.                                                                           

                                                                                                  

Administered Medications:                                                                         

                                                                                             

23:04 Drug: Lidocaine-Epinephrine -1%: (1:100,000) 1 vials {Note: administered by provider.}  rv  

      Volume: 20 ml; Route: Infiltration;                                                         

                                                                                                  

                                                                                                  

Outcome:                                                                                          

                                                                                             

00:06 Discharge ordered by MD.                                                                [id number] 

00:25 Discharged to home via wheelchair, with significant other.                              rv  

00:25 Condition: stable                                                                           

00:25 Discharge instructions given to patient, Instructed on discharge instructions, follow       

      up and referral plans. Demonstrated understanding of instructions, follow-up care.          

00:27 Patient left the ED.                                                                    ea  

                                                                                                  

Signatures:                                                                                       

Robbie Hawkins PA PA   [id number]                                                  

Miguel A Villagomez Elena RN                      RN   ea                                                   

Tevin Thomas RN                    RN   rv                                                   

Mahendra Velásquez                             cf2                                                  

                                                                                                  

Corrections: (The following items were deleted from the chart)                                    

                                                                                             

22:51 22:50 Derm: Wound noted right femoral area Wound is POST OP WOUND rv                    rv  

                                                                                                  

**************************************************************************************************

## 2020-03-02 NOTE — EDPHYS
Physician Documentation                                                                           

 [facility]                                                                 

Name: Larry Palm                                                                                

Age: 64 yrs                                                                                       

Sex: Male                                                                                         

: 1955                                                                                   

MRN: [id number]                                                                                   

Arrival Date: 2020                                                                          

Time: 22:31                                                                                       

Account#: [id number]                                                                            

Bed 2                                                                                             

Private MD:                                                                                       

ED Physician Braxton Hoskins                                                                             

HPI:                                                                                              

                                                                                             

23:54 This 64 yrs old Black Male presents to ER via Ambulatory with complaints of BLEEDING    jr8 

      WOUND.                                                                                      

23:54 Patient came to ED tonight for bleeding surgical wound to right inguinal region. Stated jr8 

      that he [facility] Has been doing wet to dry      

      packing with home health. Stated that tonight started to have active bleeding from one      

      of the open sites . Severity of symptoms: At their worst the symptoms were moderate in      

      the emergency department the symptoms are unchanged. The patient has not experienced        

      similar symptoms in the past. The patient has not recently seen a physician.                

                                                                                                  

Historical:                                                                                       

- Allergies:                                                                                      

22:49 No Known Allergies;                                                                     rv  

- PMHx:                                                                                           

22:49 Diabetes - NIDDM; Hypertension;                                                         rv  

- PSHx:                                                                                           

22:49 GROIN SURGERY;                                                                          rv  

                                                                                                  

- Immunization history:: Adult Immunizations up to date.                                          

- Social history:: Smoking status: Patient reports the use of cigarette tobacco                   

  products, 3 CIGS A DAY.                                                                         

                                                                                                  

                                                                                                  

ROS:                                                                                              

23:54 Eyes: Negative for injury, pain, redness, and discharge, ENT: Negative for injury,      jr8 

      pain, and discharge, Neck: Negative for injury, pain, and swelling, Cardiovascular:         

      Negative for chest pain, palpitations, and edema, Respiratory: Negative for shortness       

      of breath, cough, wheezing, and pleuritic chest pain, Abdomen/GI: Negative for              

      abdominal pain, nausea, vomiting, diarrhea, and constipation, Back: Negative for injury     

      and pain, MS/Extremity: Negative for injury and deformity, Neuro: Negative for              

      headache, weakness, numbness, tingling, and seizure.                                        

23:54 Skin: Positive for of the right inguinal region , open wounds.                              

                                                                                                  

Exam:                                                                                             

23:54 Eyes:  Pupils equal round and reactive to light, extra-ocular motions intact.  Lids and jr8 

      lashes normal.  Conjunctiva and sclera are non-icteric and not injected.  Cornea within     

      normal limits.  Periorbital areas with no swelling, redness, or edema. ENT:  Nares          

      patent. No nasal discharge, no septal abnormalities noted.  Tympanic membranes are          

      normal and external auditory canals are clear.  Oropharynx with no redness, swelling,       

      or masses, exudates, or evidence of obstruction, uvula midline.  Mucous membranes           

      moist. Neck:  Trachea midline, no thyromegaly or masses palpated, and no cervical           

      lymphadenopathy.  Supple, full range of motion without nuchal rigidity, or vertebral        

      point tenderness.  No Meningismus. Cardiovascular:  Regular rate and rhythm with a          

      normal S1 and S2.  No gallops, murmurs, or rubs.  Normal PMI, no JVD.  No pulse             

      deficits. Respiratory:  Lungs have equal breath sounds bilaterally, clear to                

      auscultation and percussion.  No rales, rhonchi or wheezes noted.  No increased work of     

      breathing, no retractions or nasal flaring. Abdomen/GI:  Soft, non-tender, with normal      

      bowel sounds.  No distension or tympany.  No guarding or rebound.  No evidence of           

      tenderness throughout. Back:  No spinal tenderness.  No costovertebral tenderness.          

      Full range of motion. MS/ Extremity:  Pulses equal, no cyanosis.  Neurovascular intact.     

       Full, normal range of motion. Neuro:  Awake and alert, GCS 15, oriented to person,         

      place, time, and situation.  Cranial nerves II-XII grossly intact.  Motor strength 5/5      

      in all extremities.  Sensory grossly intact.  Cerebellar exam normal.  Normal gait.         

23:54 Skin: Patient has two surgical debridement wounds noted to right inguinal region. The       

      largest being about 10 cm by 7.5 cm. This wound had active arteriole bleeding noted.        

      Other surgical site unremarkable. Tissue to both of them healthy, pink, and without         

      discharge or surrounding erythema .                                                         

                                                                                                  

Vital Signs:                                                                                      

22:46  / 98; Pulse 92; Resp 18; Temp 98.6; Pulse Ox 99% ; Weight 113.4 kg; Height 6 ft. rv  

      5 in. (195.58 cm); Pain 5/10;                                                               

23:28  / 80; Pulse 82; Resp 18; Pulse Ox 100% on R/A;                                   ea  

                                                                                             

00:15  / 87; Pulse 90; Resp 18; Pulse Ox 99% on R/A;                                    ea  

                                                                                             

22:46 Body Mass Index 29.65 (113.40 kg, 195.58 cm)                                            rv  

                                                                                                  

Procedures:                                                                                       

                                                                                             

23:54 Performed Cauterization of vessel . Bovie cautery utilized to ligate small arteriole.   jr8 

      Was successful. No bleeding at this time .                                                  

                                                                                                  

MDM:                                                                                              

23:01 Patient medically screened.                                                             jr8 

                                                                                             

00:03 Data reviewed: vital signs, nurses notes, lab test result(s), and as a result, I will   jr8 

      discharge patient. Data interpreted: Pulse oximetry: on room air is 100 %.                  

      Interpretation: normal. Counseling: I had a detailed discussion with the patient and/or     

      guardian regarding: the historical points, exam findings, and any diagnostic results        

      supporting the discharge/admit diagnosis, lab results, the need for outpatient follow       

      up, a general surgeon, to return to the emergency department if symptoms worsen or          

      persist or if there are any questions or concerns that arise at home. Response to           

      treatment: the patient's symptoms have resolved after treatment. ED course: Patient         

      still doing well. No active bleeding at this time. Wounds repacked and dressed. Labs        

      unremarkable. [facility] .                                                   

                                                                                                  

                                                                                             

23:01 Order name: CBC with Diff; Complete Time: 23:54                                         jr8 

                                                                                             

23:01 Order name: Basic Metabolic Panel; Complete Time: 23:54                                 jr8 

                                                                                             

23:01 Order name: IV; Complete Time: 23:22                                                    jr8 

                                                                                             

00:03 Order name: Wound dressing; Complete Time: 00:19                                        jr8 

                                                                                                  

Administered Medications:                                                                         

                                                                                             

23:04 Drug: Lidocaine-Epinephrine -1%: (1:100,000) 1 vials {Note: administered by provider.}  rv  

      Volume: 20 ml; Route: Infiltration;                                                         

                                                                                                  

                                                                                                  

Disposition:                                                                                      

                                                                                             

01:05 Co-signature as Attending Physician, Braxton Hoskins MD.                                        radha 

                                                                                                  

Disposition:                                                                                      

20 00:06 Discharged to Home. Impression: Encounter for Arteriole bleed of open surgical     

  wound .                                                                                         

- Condition is Stable.                                                                            

- Discharge Instructions: Wound Care.                                                             

                                                                                                  

- Medication Reconciliation Form, Thank You Letter, Antibiotic Education, Prescription            

  Opioid Use form.                                                                                

- Follow up: Private Physician; When: 1 - 2 days; Reason: Wound Recheck, Recheck                  

  today's complaints, Continuance of care, Re-evaluation by your physician.                       

- Problem is new.                                                                                 

- Symptoms have improved.                                                                         

                                                                                                  

                                                                                                  

                                                                                                  

Signatures:                                                                                       

Dispatcher [employer]                           EDMS                                                 

Braxton Hoskins MD MD   pkRobbie Alcantara PA PA   jr8                                                  

Champion, Isela, RN                      RN   ea                                                   

William, Tevin, RN                    RN   rv                                                   

                                                                                                  

Corrections: (The following items were deleted from the chart)                                    

00:27 00:06 2020 00:06 Discharged to Home. Impression: Encounter for Arteriole bleed of ea  

      open surgical wound . Condition is Stable. Forms are Medication Reconciliation Form,        

      Thank You Letter, Antibiotic Education, Prescription Opioid Use. Follow up: Private         

      Physician; When: 1 - 2 days; Reason: Wound Recheck, Recheck today's complaints,             

      Continuance of care, Re-evaluation by your physician. Problem is new. Symptoms have         

      improved. jr8                                                                               

                                                                                                  

**************************************************************************************************

## 2020-07-12 ENCOUNTER — HOSPITAL ENCOUNTER (EMERGENCY)
Dept: HOSPITAL 97 - ER | Age: 65
Discharge: HOME | End: 2020-07-12
Payer: OTHER GOVERNMENT

## 2020-07-12 VITALS — OXYGEN SATURATION: 98 % | TEMPERATURE: 97.8 F

## 2020-07-12 VITALS — DIASTOLIC BLOOD PRESSURE: 68 MMHG | SYSTOLIC BLOOD PRESSURE: 175 MMHG

## 2020-07-12 DIAGNOSIS — E11.9: ICD-10-CM

## 2020-07-12 DIAGNOSIS — Z20.828: ICD-10-CM

## 2020-07-12 DIAGNOSIS — I10: ICD-10-CM

## 2020-07-12 DIAGNOSIS — J06.9: Primary | ICD-10-CM

## 2020-07-12 PROCEDURE — 99284 EMERGENCY DEPT VISIT MOD MDM: CPT

## 2020-07-12 PROCEDURE — 93005 ELECTROCARDIOGRAM TRACING: CPT

## 2020-07-12 PROCEDURE — 87081 CULTURE SCREEN ONLY: CPT

## 2020-07-12 PROCEDURE — 87804 INFLUENZA ASSAY W/OPTIC: CPT

## 2020-07-12 PROCEDURE — 71045 X-RAY EXAM CHEST 1 VIEW: CPT

## 2020-07-12 PROCEDURE — 87070 CULTURE OTHR SPECIMN AEROBIC: CPT

## 2020-07-12 NOTE — ER
Nurse's Notes                                                                                     

 [facility]                                                                 

Name: Larry Palm                                                                                

Age: 64 yrs                                                                                       

Sex: Male                                                                                         

: 1955                                                                                   

MRN: [id number]                                                                                   

Arrival Date: 2020                                                                          

Time: 01:32                                                                                       

Account#: [id number]                                                                            

Bed 3                                                                                             

Private MD:                                                                                       

Diagnosis: Acute upper respiratory infection, unspecified                                         

                                                                                                  

Presentation:                                                                                     

                                                                                             

01:40 Chief complaint: Chief complaint: Patient states: Danae just had some chest tightness and sg  

      a cough for a couple days now, no fever/chills reported, denies N/V/D.                      

01:40 Coronavirus screen: Proceed with normal triage. Ebola Screen: Patient negative for      sg  

      fever greater than or equal to 101.5 degrees Fahrenheit, and additional compatible          

      Ebola Virus Disease symptoms Patient denies exposure to infectious person. Patient          

      denies travel to an Ebola-affected area in the 21 days before illness onset. No             

      symptoms or risks identified at this time. Initial Sepsis Screen: Does the patient meet     

      any 2 criteria? No. Patient's initial sepsis screen is negative. Does the patient have      

      a suspected source of infection? No. Patient's initial sepsis screen is negative. Risk      

      Assessment: Do you want to hurt yourself or someone else? Patient reports no desire to      

      harm self or others. Onset of symptoms was 2020. Care prior to arrival: None.      

      Transition of care: patient was not received from another setting of care.                  

01:40 Method Of Arrival: Ambulatory                                                           sg  

01:40 Acuity: MARLEN 4                                                                           sg  

02:14 Method Of Arrival: Ambulatory                                                           ea  

                                                                                                  

Triage Assessment:                                                                                

02:16 General: Appears in no apparent distress. Behavior is calm, cooperative, appropriate    ea  

      for age. Pain: Denies pain. Neuro: Level of Consciousness is awake, alert, obeys            

      commands, Oriented to person, place, time, situation. Cardiovascular: Patient's skin is     

      warm and dry. Respiratory: Airway is patent Respiratory effort is even, unlabored,          

      Respiratory pattern is regular, symmetrical. Derm: Skin is pink, warm \T\ dry.              

                                                                                                  

Historical:                                                                                       

- Allergies:                                                                                      

02:15 No Known Allergies;                                                                     sg  

- PMHx:                                                                                           

02:15 Diabetes - NIDDM; Hypertension;                                                         sg  

- PSHx:                                                                                           

02:15 GROIN SURGERY;                                                                          sg  

                                                                                                  

- Immunization history:: Adult Immunizations up to date.                                          

- Social history:: Smoking status: Patient denies any tobacco usage or history of.                

                                                                                                  

                                                                                                  

Screenin:15 Abuse screen: Denies threats or abuse. Nutritional screening: No deficits noted.        ea  

      Tuberculosis screening: No symptoms or risk factors identified. Fall Risk None              

      identified.                                                                                 

                                                                                                  

Assessment:                                                                                       

02:00 General: Appears uncomfortable, Behavior is appropriate for age. Pain: Denies pain.     ea  

      Neuro: Level of Consciousness is awake, alert, obeys commands, Oriented to person,          

      place, time. Respiratory: Airway is patent Respiratory effort is even, unlabored,           

      Respiratory pattern is regular, symmetrical. Derm: Skin is pink, warm \T\ dry.              

03:44 Reassessment: Patient and/or family updated on plan of care and expected duration. Pain ea  

      level reassessed. Patient is alert, oriented x 3, equal unlabored respirations, skin        

      warm/dry/pink. Discharge instruction given to patient, verbalized the understanding of      

      instruction. Pt left ED ambulatory , tolerating well.                                       

                                                                                                  

Vital Signs:                                                                                      

02:14  / 98; Pulse 56; Resp 19; Temp 97.8; Pulse Ox 98% ;                               ea  

03:30  / 68; Pulse 60; Resp 18; Pulse Ox 98% on R/A;                                    ea  

                                                                                                  

ED Course:                                                                                        

01:32 Patient arrived in ED.                                                                  ds1 

01:40 Arm band placed on. Antipyretics given from triage as ordered by an ER provider.        sg  

      Antipyretics given from triage as ordered by an ER provider.                                

01:52 Samuel Stubbs MD is Attending Physician.                                            tw4 

01:57 Isela Champion, RN is Primary Nurse.                                                    ea  

02:14 Triage completed.                                                                       sg  

02:15 Patient has correct armband on for positive identification. Bed in low position. Call   ea  

      light in reach. Side rails up X2. Pulse ox on. NIBP on.                                     

02:16 Patient maintains SpO2 saturation greater than 95% on room air.                         ea  

03:02 CXR XRAY In Process Unspecified.                                                        EDMS

03:43 No provider procedures requiring assistance completed. Patient did not have IV access   ea  

      during this emergency room visit.                                                           

                                                                                                  

Administered Medications:                                                                         

No medications were administered                                                                  

                                                                                                  

                                                                                                  

Outcome:                                                                                          

03:35 Discharge ordered by MD.                                                                tw4 

03:44 Discharged to home ambulatory.                                                          ea  

03:44 Condition: stable                                                                           

03:44 Discharge instructions given to patient, Instructed on discharge instructions, follow       

      up and referral plans. medication usage, Demonstrated understanding of instructions,        

      follow-up care, medications, Prescriptions given X 1.                                       

03:45 Patient left the ED.                                                                    ea  

                                                                                                  

Addendum:                                                                                         

07/15/2020                                                                                        

     13:56 Addendum: COVID-19 Result: Negative result given to RN to notify pt. Contacted by:      celso Raya RN.                                                                       

                                                                                                  

Signatures:                                                                                       

Dispatcher [employer]                           Cathie Richter RN                    RN   Zafar Haynes RN RN sg Sanford, Demi                                ds1                                                  

Isela Champion RN RN ea Wadley, Terrence, MD MD   tw4                                                  

                                                                                                  

Corrections: (The following items were deleted from the chart)                                    

                                                                                             

02:14 02:13 Chief complaint: sg                                                               sg  

                                                                                                  

**************************************************************************************************

## 2020-07-12 NOTE — EDPHYS
Physician Documentation                                                                           

 [facility]                                                                 

Name: Larry Palm                                                                                

Age: 64 yrs                                                                                       

Sex: Male                                                                                         

: 1955                                                                                   

MRN: [id number]                                                                                   

Arrival Date: 2020                                                                          

Time: 01:32                                                                                       

Account#: [id number]                                                                            

Bed 3                                                                                             

Private MD:                                                                                       

ED Physician Samuel Stubbs                                                                     

HPI:                                                                                              

                                                                                             

02:53 This 64 yrs old Black Male presents to ER via Ambulatory with complaints of Chest       tw4 

      Tightness, Cough.                                                                           

02:53 The patient or guardian reports cough. Onset: The symptoms/episode began/occurred       tw4 

      today. Severity of symptoms: At their worst the symptoms were very mild, in the             

      emergency department the symptoms are unchanged. Modifying factors: The symptoms are        

      alleviated by nothing, the symptoms are aggravated by nothing. The patient has not          

      experienced similar symptoms in the past.                                                   

                                                                                                  

Historical:                                                                                       

- Allergies:                                                                                      

02:15 No Known Allergies;                                                                     sg  

- PMHx:                                                                                           

02:15 Diabetes - NIDDM; Hypertension;                                                         sg  

- PSHx:                                                                                           

02:15 GROIN SURGERY;                                                                          sg  

                                                                                                  

- Immunization history:: Adult Immunizations up to date.                                          

- Social history:: Smoking status: Patient denies any tobacco usage or history of.                

                                                                                                  

                                                                                                  

ROS:                                                                                              

02:53 Constitutional: Negative for fever, chills, and weight loss, Eyes: Negative for injury, tw4 

      pain, redness, and discharge, Cardiovascular: Negative for chest pain, palpitations,        

      and edema, Abdomen/GI: Negative for abdominal pain, nausea, vomiting, diarrhea, and         

      constipation, Back: Negative for injury and pain, MS/Extremity: Negative for injury and     

      deformity, Skin: Negative for injury, rash, and discoloration, Neuro: Negative for          

      headache, weakness, numbness, tingling, and seizure.                                        

02:53 Cardiovascular: Positive for chest pain, with cough, of the epigastric area.                

02:53 Respiratory: Positive for cough, shortness of breath, on exertion. Negative for cough,      

      dyspnea on exertion, hemoptysis, orthopnea, shortness of breath.                            

                                                                                                  

Exam:                                                                                             

02:53 Constitutional:  This is a well developed, well nourished patient who is awake, alert,  tw4 

      and in no acute distress. Head/Face:  Normocephalic, atraumatic. Chest/axilla:  Normal      

      chest wall appearance and motion.  Nontender with no deformity.  No lesions are             

      appreciated. Cardiovascular:  Regular rate and rhythm with a normal S1 and S2.  No          

      gallops, murmurs, or rubs.  Normal PMI, no JVD.  No pulse deficits. Respiratory:  Lungs     

      have equal breath sounds bilaterally, clear to auscultation and percussion.  No rales,      

      rhonchi or wheezes noted.  No increased work of breathing, no retractions or nasal          

      flaring. Abdomen/GI:  Soft, non-tender, with normal bowel sounds.  No distension or         

      tympany.  No guarding or rebound.  No evidence of tenderness throughout. Back:  No          

      spinal tenderness.  No costovertebral tenderness.  Full range of motion. Skin:  Warm,       

      dry with normal turgor.  Normal color with no rashes, no lesions, and no evidence of        

      cellulitis. MS/ Extremity:  Pulses equal, no cyanosis.  Neurovascular intact.  Full,        

      normal range of motion. Neuro:  Awake and alert, GCS 15, oriented to person, place,         

      time, and situation.  Cranial nerves II-XII grossly intact.  Motor strength 5/5 in all      

      extremities.  Sensory grossly intact.  Cerebellar exam normal.  Normal gait.                

                                                                                                  

Vital Signs:                                                                                      

02:14  / 98; Pulse 56; Resp 19; Temp 97.8; Pulse Ox 98% ;                               ea  

03:30  / 68; Pulse 60; Resp 18; Pulse Ox 98% on R/A;                                    ea  

                                                                                                  

MDM:                                                                                              

01:52 Patient medically screened.                                                             tw4 

03:11 Differential Diagnosis: Obstructed Airway Bronchitis Influenza Upper Respiratory        tw4 

      Infection. Data reviewed: vital signs, nurses notes. Data reviewed: lab test result(s),     

      CBC, electrolytes, Flu: negative EKG. Data interpreted: Pulse oximetry: Interpretation:     

      normal. Test interpretation: by ED physician or midlevel provider: ECG, plain               

      radiologic studies. Counseling: I had a detailed discussion with the patient and/or         

      guardian regarding: the historical points, exam findings, and any diagnostic results        

      supporting the discharge/admit diagnosis, lab results, the need for outpatient follow       

      up.                                                                                         

03:34 Special discussion: I discussed with the patient/guardian in detail that at this point  tw4 

      there is no indication for admission to the hospital. It is understood, however, that       

      if the symptoms persist or worsen the patient needs to return immediately for               

      re-evaluation.                                                                              

                                                                                                  

                                                                                             

01:37 Order name: COVID-19                                                                    tw4 

                                                                                             

01:38 Order name: Flu                                                                         tw4 

                                                                                             

01:37 Order name: CXR XRAY                                                                    tw4 

07/12                                                                                             

01:38 Order name: Strep                                                                                                                                                                    

03:12 Order name: EKG; Complete Time: 03:13                                                                                                                                                

01:38 Order name: Droplet/Contact Precautions; Complete Time: 02:13                                                                                                                        

01:38 Order name: Labs collected and sent; Complete Time: 02:13                                                                                                                            

01:38 Order name: O2 Per Protocol; Complete Time: 02:13                                                                                                                                    

03:20 Order name: EKG - Nurse/Tech; Complete Time: 03:27                                      ea  

                                                                                                  

EC:34 Rate is 74 beats/min. Rhythm is regular. Left axis deviation noted. WV interval is      tw4 

      normal. QRS interval is normal. QT interval is normal. No Q waves. T waves are Normal.      

      No ST changes noted. Clinical impression: 1st degree heart block and LVH. Interpreted       

      by me. Reviewed by me.                                                                      

                                                                                                  

Administered Medications:                                                                         

No medications were administered                                                                  

                                                                                                  

                                                                                                  

Disposition:                                                                                      

20 03:35 Discharged to Home. Impression: Acute upper respiratory infection, unspecified.    

- Condition is Stable.                                                                            

- Discharge Instructions: Viral Respiratory Infection.                                            

- Prescriptions for Tessalon Perles 100 mg Oral Capsule - take 1 capsule by ORAL route            

  every 8 hours As needed; 15 capsule.                                                            

- Medication Reconciliation Form, Thank You Letter, Antibiotic Education, Prescription            

  Opioid Use form.                                                                                

- Follow up: Private Physician; When: Upon discharge from the Emergency Department;               

  Reason: Recheck today's complaints, Continuance of care, Re-evaluation by your                  

  physician.                                                                                      

- Problem is new.                                                                                 

- Symptoms have improved.                                                                         

                                                                                                  

                                                                                                  

                                                                                                  

Signatures:                                                                                       

Dispatcher [employer]                           EDZafar Shah RN RN sg Antunez, Elena, RN RN ea Wadley, Terrence, MD MD   tw4                                                  

                                                                                                  

Corrections: (The following items were deleted from the chart)                                    

03:45 03:35 2020 03:35 Discharged to Home. Impression: Acute upper respiratory          ea  

      infection, unspecified. Condition is Stable. Forms are Medication Reconciliation Form,      

      Thank You Letter, Antibiotic Education, Prescription Opioid Use. Follow up: Private         

      Physician; When: Upon discharge from the Emergency Department; Reason: Recheck today's      

      complaints, Continuance of care, Re-evaluation by your physician. Problem is new.           

      Symptoms have improved. tw4                                                                 

                                                                                                  

**************************************************************************************************

## 2020-07-12 NOTE — RAD REPORT
EXAM DESCRIPTION:  Annie Single View7/12/2020 3:03 am

 

CLINICAL HISTORY:  Chest pain

 

COMPARISON:  2015

 

FINDINGS:   The lungs appear clear of acute infiltrate. The heart is normal size

 

IMPRESSION:   No acute abnormalities displayed